# Patient Record
Sex: MALE | Race: WHITE | NOT HISPANIC OR LATINO | Employment: FULL TIME | URBAN - METROPOLITAN AREA
[De-identification: names, ages, dates, MRNs, and addresses within clinical notes are randomized per-mention and may not be internally consistent; named-entity substitution may affect disease eponyms.]

---

## 2017-03-17 ENCOUNTER — OFFICE VISIT (OUTPATIENT)
Dept: MEDICAL GROUP | Facility: MEDICAL CENTER | Age: 62
End: 2017-03-17
Payer: COMMERCIAL

## 2017-03-17 VITALS
SYSTOLIC BLOOD PRESSURE: 148 MMHG | RESPIRATION RATE: 14 BRPM | HEART RATE: 86 BPM | BODY MASS INDEX: 25.05 KG/M2 | DIASTOLIC BLOOD PRESSURE: 84 MMHG | OXYGEN SATURATION: 96 % | TEMPERATURE: 98.1 F | WEIGHT: 175 LBS | HEIGHT: 70 IN

## 2017-03-17 DIAGNOSIS — R21 RASH AND NONSPECIFIC SKIN ERUPTION: ICD-10-CM

## 2017-03-17 DIAGNOSIS — Z72.0 TOBACCO ABUSE: ICD-10-CM

## 2017-03-17 DIAGNOSIS — E55.9 VITAMIN D DEFICIENCY DISEASE: ICD-10-CM

## 2017-03-17 DIAGNOSIS — I10 ESSENTIAL HYPERTENSION: ICD-10-CM

## 2017-03-17 PROCEDURE — 99213 OFFICE O/P EST LOW 20 MIN: CPT | Performed by: NURSE PRACTITIONER

## 2017-03-17 RX ORDER — LISINOPRIL AND HYDROCHLOROTHIAZIDE 20; 12.5 MG/1; MG/1
1 TABLET ORAL DAILY
Qty: 90 TAB | Refills: 3 | Status: SHIPPED | OUTPATIENT
Start: 2017-03-17 | End: 2018-03-30 | Stop reason: SDUPTHER

## 2017-03-17 ASSESSMENT — PATIENT HEALTH QUESTIONNAIRE - PHQ9: CLINICAL INTERPRETATION OF PHQ2 SCORE: 0

## 2017-03-17 NOTE — PROGRESS NOTES
Subjective:      Jamal Erazo is a 62 y.o. male who presents with Medication Refill            HPI Jamal Erazo is here today for yearly follow-up on hypertension as well as a skin rash and to discuss lab results.      1. Rash and nonspecific skin eruption  Patient reports he noticed about 6 months ago a rash on his right elbow. It does not cause him any pain and there is only mild pruritus. It has grown in size somewhat and does not appear to be going away. Nothing makes it better or worse.    2. Essential hypertension  Patient continues on his lisinopril with HCTZ and his lab work comes back normal except for mildly decreased sodium levels. His blood pressure in the office is slightly elevated but he does not check his blood pressure outside the office. He denies side effects from the medicine.    3. Vitamin D deficiency disease  Patient has been taking over-the-counter vitamin D and subsequently his vitamin D levels have improved.    4. Tobacco abuse  Patient continues to smoke cigarettes on a regular basis and has been unable to quit. He states he has never had any imaging of his lungs. He denies increased shortness of breath or cough.    Social History   Substance Use Topics   • Smoking status: Current Every Day Smoker -- 0.50 packs/day     Types: Cigarettes   • Smokeless tobacco: Never Used   • Alcohol Use: 21.0 oz/week     42 Cans of beer per week     Current Outpatient Prescriptions   Medication Sig Dispense Refill   • lisinopril-hydrochlorothiazide (PRINZIDE, ZESTORETIC) 20-12.5 MG per tablet Take 1 Tab by mouth every day. 90 Tab 3   • vitamin D (CHOLECALCIFEROL) 1000 UNIT TABS Take 1,000 Units by mouth every day.       No current facility-administered medications for this visit.     Family History   Problem Relation Age of Onset   • Other Mother      complications of surgery   • Hypertension Father      Past Medical History   Diagnosis Date   • HTN (hypertension) 2/21/2013       Review of Systems  "  Skin: Positive for rash.   All other systems reviewed and are negative.         Objective:     /84 mmHg  Pulse 86  Temp(Src) 36.7 °C (98.1 °F)  Resp 14  Ht 1.778 m (5' 10\")  Wt 79.379 kg (175 lb)  BMI 25.11 kg/m2  SpO2 96%     Physical Exam   Constitutional: He is oriented to person, place, and time. He appears well-developed and well-nourished. No distress.   HENT:   Head: Normocephalic and atraumatic.   Right Ear: External ear normal.   Left Ear: External ear normal.   Nose: Nose normal.   Mouth/Throat: Oropharynx is clear and moist.   Eyes: Conjunctivae are normal. Right eye exhibits no discharge. Left eye exhibits no discharge.   Neck: Normal range of motion. Neck supple. No tracheal deviation present. No thyromegaly present.   Cardiovascular: Normal rate, regular rhythm and normal heart sounds.    No murmur heard.  Pulmonary/Chest: Effort normal and breath sounds normal. No respiratory distress. He has no wheezes. He has no rales.   Lymphadenopathy:     He has no cervical adenopathy.   Neurological: He is alert and oriented to person, place, and time. Coordination normal.   Skin: Skin is warm and dry. Rash noted. He is not diaphoretic. No erythema.   Reddish, purplish, raised areas on the right elbow. No discharge or abscess.   Psychiatric: He has a normal mood and affect. His behavior is normal. Judgment and thought content normal.   Nursing note and vitals reviewed.         Component      Latest Ref Rng 3/25/2016 3/25/2016 3/25/2016 3/25/2016           7:07 AM  7:07 AM  7:07 AM  7:07 AM   Co2      20 - 33 mmol/L       Glucose      65 - 99 mg/dL       Bun      8 - 22 mg/dL       Creatinine      0.50 - 1.40 mg/dL       Calcium      8.5 - 10.5 mg/dL       AST(SGOT)      12 - 45 U/L       ALT(SGPT)      2 - 50 U/L       Alkaline Phosphatase      30 - 99 U/L       Total Bilirubin      0.1 - 1.5 mg/dL       Albumin      3.2 - 4.9 g/dL       Total Protein      6.0 - 8.2 g/dL       Globulin      1.9 - " 3.5 g/dL       A-G Ratio             Sodium      135 - 145 mmol/L       Potassium      3.6 - 5.5 mmol/L       Chloride      96 - 112 mmol/L       Anion Gap      0.0 - 11.9       Cholesterol,Tot      100 - 199 mg/dL       Triglycerides      0 - 149 mg/dL       HDL      >=40 mg/dL       LDL      <100 mg/dL       Glycohemoglobin      0.0 - 5.6 %   5.4    Estim. Avg Glu         108    GFR If African American      >60 mL/min/1.73 m 2 >60      GFR If Non African American      >60 mL/min/1.73 m 2 >60      Prostatic Specific Antigen Tot      0.00 - 4.00 ng/mL    2.72   Vitamin D-1, 25-Dihydroxy      19.9 - 79.3 pg/mL  47.2       Component      Latest Ref Rng 3/25/2016 3/25/2016           7:07 AM  7:07 AM   Co2      20 - 33 mmol/L  30   Glucose      65 - 99 mg/dL  102 (H)   Bun      8 - 22 mg/dL  9   Creatinine      0.50 - 1.40 mg/dL  0.69   Calcium      8.5 - 10.5 mg/dL  9.6   AST(SGOT)      12 - 45 U/L  22   ALT(SGPT)      2 - 50 U/L  26   Alkaline Phosphatase      30 - 99 U/L  68   Total Bilirubin      0.1 - 1.5 mg/dL  0.5   Albumin      3.2 - 4.9 g/dL  4.7   Total Protein      6.0 - 8.2 g/dL  7.7   Globulin      1.9 - 3.5 g/dL  3.0   A-G Ratio        1.6   Sodium      135 - 145 mmol/L  132 (L)   Potassium      3.6 - 5.5 mmol/L  4.4   Chloride      96 - 112 mmol/L  96   Anion Gap      0.0 - 11.9  6.0   Cholesterol,Tot      100 - 199 mg/dL 139    Triglycerides      0 - 149 mg/dL 88    HDL      >=40 mg/dL 50    LDL      <100 mg/dL 71    Glycohemoglobin      0.0 - 5.6 %     Estim. Avg Glu           GFR If African American      >60 mL/min/1.73 m 2     GFR If Non African American      >60 mL/min/1.73 m 2     Prostatic Specific Antigen Tot      0.00 - 4.00 ng/mL     Vitamin D-1, 25-Dihydroxy      19.9 - 79.3 pg/mL          Assessment/Plan:     1. Rash and nonspecific skin eruption  I advised patient that he have dermatology look at this to see if it needs be removed and treated. He states he has never been to a  dermatologist.  - REFERRAL TO DERMATOLOGY    2. Essential hypertension  I have refilled patient's medications for the year but I advised him to check his blood pressure outside the office and if it is running above 140/90 on a regular basis, I advised him return to the office so we can start him on additional medicine. I would be reluctant to increase his HCTZ because his sodium levels are slightly low.  - lisinopril-hydrochlorothiazide (PRINZIDE, ZESTORETIC) 20-12.5 MG per tablet; Take 1 Tab by mouth every day.  Dispense: 90 Tab; Refill: 3    3. Vitamin D deficiency disease  Patient will continue with low-dose vitamin D.    4. Tobacco abuse  Patient would be willing to go for lung cancer screening if it is covered by his insurance.  - REFERRAL TO LUNG CANCER SCREENING PROGRAM

## 2017-03-17 NOTE — MR AVS SNAPSHOT
"        Jamal Erazo   3/17/2017 8:20 AM   Office Visit   MRN: [id number]    Department:  [address] [place] [address]   Dept Phone:  [phone]    Description:  Male : 1955   Provider:  NEHAL Estrella.           Reason for Visit     Medication Refill lisinipril      Allergies as of 3/17/2017     No Known Allergies      You were diagnosed with     Rash and nonspecific skin eruption   [622701]       Essential hypertension   [9466510]       Vitamin D deficiency disease   [397837]       Tobacco abuse   [330752]         Vital Signs     Blood Pressure Pulse Temperature Respirations Height Weight    148/84 mmHg 86 36.7 °C (98.1 °F) 14 1.778 m (5' 10\") 79.379 kg (175 lb)    Body Mass Index Oxygen Saturation Smoking Status             25.11 kg/m2 96% Current Every Day Smoker         Basic Information     Date Of Birth Sex Race Ethnicity Preferred Language    1955 Male Unknown Unknown English      Problem List              ICD-10-CM Priority Class Noted - Resolved    Vitamin D deficiency disease E55.9   2014 - Present    Impaired fasting glucose R73.01   3/19/2015 - Present    Essential hypertension I10   3/18/2016 - Present    Right foot pain M79.671   3/18/2016 - Present    Tobacco abuse Z72.0   3/17/2017 - Present      Health Maintenance        Date Due Completion Dates    IMM INFLUENZA (1) 2016 ---    IMM ZOSTER VACCINE 2017 ---    COLONOSCOPY 2023 (N/S)    Override on 2013: (N/S)    IMM DTaP/Tdap/Td Vaccine (2 - Td) 2023            Current Immunizations     Tdap Vaccine 2013      Below and/or attached are the medications your provider expects you to take. Review all of your home medications and newly ordered medications with your provider and/or pharmacist. Follow medication instructions as directed by your provider and/or pharmacist. Please keep your medication list with you and share with your provider. Update the information when medications are " discontinued, doses are changed, or new medications (including over-the-counter products) are added; and carry medication information at all times in the event of emergency situations     Allergies:  No Known Allergies          Medications  Valid as of: March 17, 2017 -  9:11 AM    Generic Name Brand Name Tablet Size Instructions for use    Cholecalciferol (Tab) cholecalciferol 1000 UNIT Take 1,000 Units by mouth every day.        Lisinopril-Hydrochlorothiazide (Tab) PRINZIDE, ZESTORETIC 20-12.5 MG Take 1 Tab by mouth every day.        .                 Medicines prescribed today were sent to:     [id number] - [place], [place] - [address] [place] [address]    Phone: [phone] Fax: [phone]    Open 24 Hours?: No      Medication refill instructions:       If your prescription bottle indicates you have medication refills left, it is not necessary to call your provider’s office. Please contact your pharmacy and they will refill your medication.    If your prescription bottle indicates you do not have any refills left, you may request refills at any time through one of the following ways: The online Eqlim system (except Urgent Care), by calling your provider’s office, or by asking your pharmacy to contact your provider’s office with a refill request. Medication refills are processed only during regular business hours and may not be available until the next business day. Your provider may request additional information or to have a follow-up visit with you prior to refilling your medication.   *Please Note: Medication refills are assigned a new Rx number when refilled electronically. Your pharmacy may indicate that no refills were authorized even though a new prescription for the same medication is available at the pharmacy. Please request the medicine by name with the pharmacy before contacting your provider for a refill.        Referral     A referral request has been sent to  our patient care coordination department. Please allow 3-5 business days for us to process this request and contact you either by phone or mail. If you do not hear from us by the 5th business day, please call us at [phone].        Other Notes About Your Plan     Likes to be called Theron.           WorldPassKey Access Code: [email or web address]  Expires: 4/16/2017  8:28 AM    WorldPassKey  A secure, online tool to manage your health information     [employer]’s WorldPassKey® is a secure, online tool that connects you to your personalized health information from the privacy of your home -- day or night - making it very easy for you to manage your healthcare. Once the activation process is completed, you can even access your medical information using the WorldPassKey sergio, which is available for free in the Apple Sergio store or Google Play store.     WorldPassKey provides the following levels of access (as shown below):   My Chart Features   [facility] Primary Care Doctor [facility]  Specialists [facility]  Urgent  Care Non-[facility]  Primary Care  Doctor   Email your healthcare team securely and privately 24/7 X X X    Manage appointments: schedule your next appointment; view details of past/upcoming appointments X      Request prescription refills. X      View recent personal medical records, including lab and immunizations X X X X   View health record, including health history, allergies, medications X X X X   Read reports about your outpatient visits, procedures, consult and ER notes X X X X   See your discharge summary, which is a recap of your hospital and/or ER visit that includes your diagnosis, lab results, and care plan. X X       How to register for WorldPassKey:  1. Go to  [email or web address].  2. Click on the Sign Up Now box, which takes you to the New Member Sign Up page. You will need to provide the following information:  a. Enter your WorldPassKey Access Code exactly as it appears at the top of this page. (You will not need to use this  code after you’ve completed the sign-up process. If you do not sign up before the expiration date, you must request a new code.)   b. Enter your date of birth.   c. Enter your home email address.   d. Click Submit, and follow the next screen’s instructions.  3. Create a Clearpath Roboticst ID. This will be your Clearpath Roboticst login ID and cannot be changed, so think of one that is secure and easy to remember.  4. Create a Clearpath Roboticst password. You can change your password at any time.  5. Enter your Password Reset Question and Answer. This can be used at a later time if you forget your password.   6. Enter your e-mail address. This allows you to receive e-mail notifications when new information is available in UEIS.  7. Click Sign Up. You can now view your health information.    For assistance activating your UEIS account, call [phone]        Quit Tobacco Information     Do you want to quit using tobacco?    Quitting tobacco decreases risks of cancer, heart and lung disease, increases life expectancy, improves sense of taste and smell, and increases spending money, among other benefits.    If you are thinking about quitting, we can help.  • [facility] Quit Tobacco Program: 100.686.5073  o Program occurs weekly for four weeks and includes pharmacist consultation on products to support quitting smoking or chewing tobacco. A provider referral is needed for pharmacist consultation.  • Tobacco Users Help Hotline: 9-871-QUIT-NOW (886-6298) or https://[place].quitlogix.org/  o Free, confidential telephone and online coaching for [place] residents. Sessions are designed on a schedule that is convenient for you. Eligible clients receive free nicotine replacement therapy.  • Nationally: www.smokefree.gov  o Information and professional assistance to support both immediate and long-term needs as you become, and remain, a non-smoker. Smokefree.gov allows you to choose the help that best fits your needs.

## 2017-03-23 ENCOUNTER — DOCUMENTATION (OUTPATIENT)
Dept: HEMATOLOGY ONCOLOGY | Facility: MEDICAL CENTER | Age: 62
End: 2017-03-23

## 2017-03-23 ENCOUNTER — TELEPHONE (OUTPATIENT)
Dept: HEMATOLOGY ONCOLOGY | Facility: MEDICAL CENTER | Age: 62
End: 2017-03-23

## 2017-03-23 NOTE — PROGRESS NOTES
Received order for [facility].  Chart review to assess for lung cancer screening program eligibility.   Age 55-77yrs of age -62  30 pack year hx of smoking, or greater -23 pack year smoking history  Current smoker or if quit, must have quit within last 15 yrs -current  Asymptomatic (no signs or symptoms of lung cancer) -  none  No previous history of lung cancer -none  No Chest CT within past 12 mos. - none  Patient does not   meet eligibility criteria - LCSP scheduling notified to schedule the shared decision making visit.      Keyana Pérez NP

## 2017-04-07 ENCOUNTER — TELEPHONE (OUTPATIENT)
Dept: HEMATOLOGY ONCOLOGY | Facility: MEDICAL CENTER | Age: 62
End: 2017-04-07

## 2017-04-07 NOTE — TELEPHONE ENCOUNTER
Received referral to lung cancer screening program.  Called pt to assess for lung cancer screening program eligibility.   1. Age 55-80 yrs of age? Yes 62 y.o.  2. 30 pack year hx of smoking, or greater? Yes 1/2 [id number]= [id number] hx  3. Current smoker or if quit, has pt quit within last 15 yrs? Current smoker  4. Any signs or symptoms of lung cancer? No    5. Previous history of lung cancer? No  6. Chest CT within past 12 mos.? No  Patient does not meet eligibility criteria due to pack year hx less than 30.

## 2017-06-01 ENCOUNTER — RX ONLY (OUTPATIENT)
Age: 62
Setting detail: RX ONLY
End: 2017-06-01

## 2017-06-15 PROBLEM — D49.2 NEOPLASM OF UNSPECIFIED BEHAVIOR OF BONE, SOFT TISSUE, AND SKIN: Status: RESOLVED | Noted: 2017-06-01 | Resolved: 2017-06-15

## 2017-10-26 ENCOUNTER — OFFICE VISIT (OUTPATIENT)
Dept: MEDICAL GROUP | Facility: MEDICAL CENTER | Age: 62
End: 2017-10-26
Payer: COMMERCIAL

## 2017-10-26 VITALS
HEART RATE: 83 BPM | WEIGHT: 168 LBS | TEMPERATURE: 98.5 F | DIASTOLIC BLOOD PRESSURE: 98 MMHG | HEIGHT: 70 IN | SYSTOLIC BLOOD PRESSURE: 176 MMHG | BODY MASS INDEX: 24.05 KG/M2 | RESPIRATION RATE: 16 BRPM | OXYGEN SATURATION: 97 %

## 2017-10-26 DIAGNOSIS — Z72.0 TOBACCO ABUSE: ICD-10-CM

## 2017-10-26 DIAGNOSIS — R73.01 IMPAIRED FASTING GLUCOSE: ICD-10-CM

## 2017-10-26 DIAGNOSIS — I10 ESSENTIAL HYPERTENSION: ICD-10-CM

## 2017-10-26 DIAGNOSIS — Z12.5 SCREENING FOR PROSTATE CANCER: ICD-10-CM

## 2017-10-26 PROCEDURE — 99214 OFFICE O/P EST MOD 30 MIN: CPT | Performed by: NURSE PRACTITIONER

## 2017-10-26 RX ORDER — AMLODIPINE BESYLATE 5 MG/1
5 TABLET ORAL DAILY
Qty: 30 TAB | Refills: 11 | Status: SHIPPED | OUTPATIENT
Start: 2017-10-26

## 2017-10-26 NOTE — PROGRESS NOTES
I do not believe I been following her thyroid and she has not had a TSH done here in a few years. She must have a thyroid doctor   Subjective:      Jamal Erazo is a 62 y.o. male who presents with Medication Management (BP has been elevated)            HPI Jamal Erazo Is here today for need of pending lab work and problems with blood pressure.      1. Essential hypertension  Patient has been on lisinopril with HCTZ but despite this he states his blood pressure has been elevated over the past 2 months. He states his readings outside the office are similar to the readings today which are elevated. He reports he does take his medicines regularly and has not increased his tobacco or alcohol usage. His BMI is normal and he is not under increased stress. He denies headache, chest pain or shortness of breath.    2. Impaired fasting glucose  Previous blood sugar readings have been mildly elevated with normal hemoglobin A1c.    3. Screening for prostate cancer  Patient will be due for lab work in March.    4. Tobacco abuse  Patient continues to smoke cigarettes and is not ready to quit.  Social History   Substance Use Topics   • Smoking status: Current Every Day Smoker     Packs/day: 0.50     Types: Cigarettes   • Smokeless tobacco: Never Used   • Alcohol use 21.0 oz/week     42 Cans of beer per week     Current Outpatient Prescriptions   Medication Sig Dispense Refill   • amlodipine (NORVASC) 5 MG Tab Take 1 Tab by mouth every day. 30 Tab 11   • lisinopril-hydrochlorothiazide (PRINZIDE, ZESTORETIC) 20-12.5 MG per tablet Take 1 Tab by mouth every day. 90 Tab 3   • vitamin D (CHOLECALCIFEROL) 1000 UNIT TABS Take 1,000 Units by mouth every day.       No current facility-administered medications for this visit.      Past Medical History:   Diagnosis Date   • HTN (hypertension) 2/21/2013     Family History   Problem Relation Age of Onset   • Other Mother      complications of surgery   • Hypertension Father        Review of Systems   All other systems reviewed and are negative.         Objective:     BP (!) 176/98   Pulse 83   Temp 36.9 °C (98.5  "°F)   Resp 16   Ht 1.778 m (5' 10\")   Wt 76.2 kg (168 lb)   SpO2 97%   BMI 24.11 kg/m²      Physical Exam   Constitutional: He is oriented to person, place, and time. He appears well-developed and well-nourished. No distress.   HENT:   Head: Normocephalic and atraumatic.   Right Ear: External ear normal.   Left Ear: External ear normal.   Nose: Nose normal.   Mouth/Throat: Oropharynx is clear and moist.   Eyes: Conjunctivae are normal. Right eye exhibits no discharge. Left eye exhibits no discharge.   Neck: Normal range of motion. Neck supple. No tracheal deviation present. No thyromegaly present.   Cardiovascular: Normal rate, regular rhythm and normal heart sounds.    No murmur heard.  Pulmonary/Chest: Effort normal and breath sounds normal. No respiratory distress. He has no wheezes. He has no rales.   Lymphadenopathy:     He has no cervical adenopathy.   Neurological: He is alert and oriented to person, place, and time. Coordination normal.   Skin: Skin is warm and dry. No rash noted. He is not diaphoretic. No erythema.   Psychiatric: He has a normal mood and affect. His behavior is normal. Judgment and thought content normal.   Nursing note and vitals reviewed.              Assessment/Plan:     1. Essential hypertension  Patient's blood pressure in the office and outside the office have been elevated. I will have him continue on his current medication and add amlodipine 5 mg daily. I explained to her that if his readings are above 140/90 outside the office despite the medicines, he can increase it to 10 mg daily and then I will change the prescription refill dosage. He will do lab work in March. I advised him to consider quitting smoking and watching his alcohol intake as well as watching sodium intake.  - amlodipine (NORVASC) 5 MG Tab; Take 1 Tab by mouth every day.  Dispense: 30 Tab; Refill: 11  - COMP METABOLIC PANEL; Future  - LIPID PROFILE; Future    2. Impaired fasting glucose  Patient will do lab " work in March.  - HEMOGLOBIN A1C; Future    3. Screening for prostate cancer  Patient will be due for PSA screening next year and previous PSA was normal.  - PROSTATE SPECIFIC AG SCREENING; Future    4. Tobacco abuse  Patient still unwilling to quit smoking.

## 2018-02-15 ENCOUNTER — HOSPITAL ENCOUNTER (OUTPATIENT)
Dept: LAB | Facility: MEDICAL CENTER | Age: 63
End: 2018-02-15
Attending: NURSE PRACTITIONER
Payer: COMMERCIAL

## 2018-02-15 DIAGNOSIS — I10 ESSENTIAL HYPERTENSION: ICD-10-CM

## 2018-02-15 DIAGNOSIS — Z12.5 SCREENING FOR PROSTATE CANCER: ICD-10-CM

## 2018-02-15 DIAGNOSIS — R73.01 IMPAIRED FASTING GLUCOSE: ICD-10-CM

## 2018-02-15 LAB
ALBUMIN SERPL BCP-MCNC: 4.3 G/DL (ref 3.2–4.9)
ALBUMIN/GLOB SERPL: 1.5 G/DL
ALP SERPL-CCNC: 62 U/L (ref 30–99)
ALT SERPL-CCNC: 19 U/L (ref 2–50)
ANION GAP SERPL CALC-SCNC: 6 MMOL/L (ref 0–11.9)
AST SERPL-CCNC: 18 U/L (ref 12–45)
BILIRUB SERPL-MCNC: 0.6 MG/DL (ref 0.1–1.5)
BUN SERPL-MCNC: 10 MG/DL (ref 8–22)
CALCIUM SERPL-MCNC: 9 MG/DL (ref 8.5–10.5)
CHLORIDE SERPL-SCNC: 100 MMOL/L (ref 96–112)
CHOLEST SERPL-MCNC: 130 MG/DL (ref 100–199)
CO2 SERPL-SCNC: 27 MMOL/L (ref 20–33)
CREAT SERPL-MCNC: 0.58 MG/DL (ref 0.5–1.4)
EST. AVERAGE GLUCOSE BLD GHB EST-MCNC: 108 MG/DL
GLOBULIN SER CALC-MCNC: 2.8 G/DL (ref 1.9–3.5)
GLUCOSE SERPL-MCNC: 99 MG/DL (ref 65–99)
HBA1C MFR BLD: 5.4 % (ref 0–5.6)
HDLC SERPL-MCNC: 41 MG/DL
LDLC SERPL CALC-MCNC: 63 MG/DL
POTASSIUM SERPL-SCNC: 4.2 MMOL/L (ref 3.6–5.5)
PROT SERPL-MCNC: 7.1 G/DL (ref 6–8.2)
PSA SERPL-MCNC: 2.98 NG/ML (ref 0–4)
SODIUM SERPL-SCNC: 133 MMOL/L (ref 135–145)
TRIGL SERPL-MCNC: 132 MG/DL (ref 0–149)

## 2018-02-15 PROCEDURE — 84153 ASSAY OF PSA TOTAL: CPT

## 2018-02-15 PROCEDURE — 80053 COMPREHEN METABOLIC PANEL: CPT

## 2018-02-15 PROCEDURE — 83036 HEMOGLOBIN GLYCOSYLATED A1C: CPT

## 2018-02-15 PROCEDURE — 36415 COLL VENOUS BLD VENIPUNCTURE: CPT

## 2018-02-15 PROCEDURE — 80061 LIPID PANEL: CPT

## 2018-03-15 ENCOUNTER — OFFICE VISIT (OUTPATIENT)
Dept: MEDICAL GROUP | Facility: MEDICAL CENTER | Age: 63
End: 2018-03-15
Payer: COMMERCIAL

## 2018-03-15 VITALS
OXYGEN SATURATION: 97 % | HEART RATE: 85 BPM | BODY MASS INDEX: 23.91 KG/M2 | TEMPERATURE: 97.2 F | SYSTOLIC BLOOD PRESSURE: 122 MMHG | WEIGHT: 167 LBS | HEIGHT: 70 IN | RESPIRATION RATE: 16 BRPM | DIASTOLIC BLOOD PRESSURE: 74 MMHG

## 2018-03-15 DIAGNOSIS — I10 ESSENTIAL HYPERTENSION: ICD-10-CM

## 2018-03-15 DIAGNOSIS — E55.9 VITAMIN D DEFICIENCY DISEASE: ICD-10-CM

## 2018-03-15 DIAGNOSIS — R73.01 IMPAIRED FASTING GLUCOSE: ICD-10-CM

## 2018-03-15 DIAGNOSIS — Z72.0 TOBACCO ABUSE: ICD-10-CM

## 2018-03-15 DIAGNOSIS — Z11.59 NEED FOR HEPATITIS C SCREENING TEST: ICD-10-CM

## 2018-03-15 DIAGNOSIS — Z00.00 ROUTINE GENERAL MEDICAL EXAMINATION AT A HEALTH CARE FACILITY: ICD-10-CM

## 2018-03-15 DIAGNOSIS — Z23 PNEUMOCOCCAL VACCINATION GIVEN: ICD-10-CM

## 2018-03-15 PROCEDURE — 90732 PPSV23 VACC 2 YRS+ SUBQ/IM: CPT | Performed by: NURSE PRACTITIONER

## 2018-03-15 PROCEDURE — 99396 PREV VISIT EST AGE 40-64: CPT | Mod: 25 | Performed by: NURSE PRACTITIONER

## 2018-03-15 PROCEDURE — 90471 IMMUNIZATION ADMIN: CPT | Performed by: NURSE PRACTITIONER

## 2018-03-15 ASSESSMENT — PATIENT HEALTH QUESTIONNAIRE - PHQ9: CLINICAL INTERPRETATION OF PHQ2 SCORE: 0

## 2018-03-15 NOTE — PROGRESS NOTES
Subjective:      Jamal Erazo is a 63 y.o. male who presents with Annual Exam and Results (labs)        CC: Patient is here today for yearly physical.    HPI Jamal Erazo      1. Routine general medical examination at a health care facility  Patient reports he continues to work full-time as a  and does not feel that his health status is changed since he was seen last year.    2. Essential hypertension  On patient's last visit amlodipine was added to his regime of lisinopril with HCTZ because his blood pressure was elevated. He states he has had no side effects from the medicine and his blood pressure at home has been running in the 130/80 range. Today in the office it is normotensive. He did blood work which showed normal liver and kidney functions.    3. Impaired fasting glucose  Patient had mild elevation in blood sugar on a previous lab work and hemoglobin A1c today comes back well within normal range.    4. Vitamin D deficiency disease  Patient continues on vitamin D for history of vitamin D deficiency.    5. Tobacco abuse  Patient does continue to smoke cigarettes and admits he is not ready to quit.    6. Pneumococcal vaccination given  Patient needs a pneumonia vaccine because of his smoking history. He declines shingles vaccine and has had flu vaccine.    7. Need for hepatitis C screening test  Patient would like hepatitis screening from seeing the commercials for this. He denies risk factors when reviewed.  Social History   Substance Use Topics   • Smoking status: Current Every Day Smoker     Packs/day: 0.50     Types: Cigarettes   • Smokeless tobacco: Never Used   • Alcohol use 21.0 oz/week     42 Cans of beer per week     Current Outpatient Prescriptions   Medication Sig Dispense Refill   • amlodipine (NORVASC) 5 MG Tab Take 1 Tab by mouth every day. 30 Tab 11   • lisinopril-hydrochlorothiazide (PRINZIDE, ZESTORETIC) 20-12.5 MG per tablet Take 1 Tab by mouth every day. 90 Tab 3  "  • vitamin D (CHOLECALCIFEROL) 1000 UNIT TABS Take 1,000 Units by mouth every day.       No current facility-administered medications for this visit.      Family History   Problem Relation Age of Onset   • Other Mother      complications of surgery   • Hypertension Father      Past Medical History:   Diagnosis Date   • HTN (hypertension) 2/21/2013       Review of Systems   All other systems reviewed and are negative.         Objective:     /74   Pulse 85   Temp 36.2 °C (97.2 °F)   Resp 16   Ht 1.778 m (5' 10\")   Wt 75.8 kg (167 lb)   SpO2 97%   BMI 23.96 kg/m²      Physical Exam   Constitutional: He is oriented to person, place, and time. He appears well-developed and well-nourished. No distress.   HENT:   Head: Normocephalic and atraumatic.   Right Ear: External ear normal.   Left Ear: External ear normal.   Nose: Nose normal.   Mouth/Throat: Oropharynx is clear and moist.   Eyes: Conjunctivae are normal. Right eye exhibits no discharge. Left eye exhibits no discharge.   Neck: Normal range of motion. Neck supple. No tracheal deviation present. No thyromegaly present.   Cardiovascular: Normal rate, regular rhythm and normal heart sounds.    No murmur heard.  Pulmonary/Chest: Effort normal and breath sounds normal. No respiratory distress. He has no wheezes. He has no rales.   Abdominal: Soft. Bowel sounds are normal. He exhibits no distension and no mass. There is no tenderness. There is no rebound and no guarding. No hernia.   Lymphadenopathy:     He has no cervical adenopathy.   Neurological: He is alert and oriented to person, place, and time. Coordination normal.   Skin: Skin is warm and dry. No rash noted. He is not diaphoretic. No erythema.   Psychiatric: He has a normal mood and affect. His behavior is normal. Judgment and thought content normal.   Nursing note and vitals reviewed.              Assessment/Plan:     1. Routine general medical examination at a health care facility  Patient will " continue to follow up yearly for his physicals and come in more often if needed with any change in health status. I did review his lab work with him today including an excellent lipid panel, normal chemistry panel and TSH.    2. Essential hypertension  Blood pressure currently well controlled with changes made last year and he will continue with his 3 medications. Kidney and liver functions are normal.    3. Impaired fasting glucose  Hemoglobin A1c is within normal range and does not need to be checked on his blood test.    4. Vitamin D deficiency disease  Patient will continue with over-the-counter vitamin D.    5. Tobacco abuse  Patient offered the low-dose CT scanning of his lungs with referral but declined. He states he also is not ready to quit smoking.    6. Pneumococcal vaccination given  I have placed the below orders and discussed them with an approved delegating provider. The MA is performing the below orders under the direction of Dr. Mena    - PNEUMOCOCCAL POLYSACCHARIDE VACCINE 23-VALENT =>1YO SQ/IM    7. Need for hepatitis C screening test    - HEP C VIRUS ANTIBODY; Future

## 2018-03-30 DIAGNOSIS — I10 ESSENTIAL HYPERTENSION: ICD-10-CM

## 2018-03-30 RX ORDER — LISINOPRIL AND HYDROCHLOROTHIAZIDE 20; 12.5 MG/1; MG/1
1 TABLET ORAL DAILY
Qty: 90 TAB | Refills: 3 | Status: SHIPPED | OUTPATIENT
Start: 2018-03-30

## 2020-05-29 ENCOUNTER — HOSPITAL ENCOUNTER (OUTPATIENT)
Facility: MEDICAL CENTER | Age: 65
End: 2020-05-29
Payer: COMMERCIAL

## 2020-06-02 LAB
SARS-COV-2 RNA SPEC QL NAA+PROBE: NOT DETECTED
SPECIMEN SOURCE: NORMAL

## 2025-01-24 ENCOUNTER — TELEPHONE (OUTPATIENT)
Dept: HEALTH INFORMATION MANAGEMENT | Facility: OTHER | Age: 70
End: 2025-01-24

## 2025-02-12 ENCOUNTER — HOSPITAL ENCOUNTER (OUTPATIENT)
Dept: RADIOLOGY | Facility: MEDICAL CENTER | Age: 70
End: 2025-02-12
Attending: STUDENT IN AN ORGANIZED HEALTH CARE EDUCATION/TRAINING PROGRAM
Payer: COMMERCIAL

## 2025-02-12 DIAGNOSIS — F17.210 CIGARETTE SMOKER: ICD-10-CM

## 2025-02-12 PROCEDURE — 71271 CT THORAX LUNG CANCER SCR C-: CPT
